# Patient Record
Sex: FEMALE
[De-identification: names, ages, dates, MRNs, and addresses within clinical notes are randomized per-mention and may not be internally consistent; named-entity substitution may affect disease eponyms.]

---

## 2024-02-09 PROBLEM — Z00.00 ENCOUNTER FOR PREVENTIVE HEALTH EXAMINATION: Status: ACTIVE | Noted: 2024-02-09

## 2024-02-13 ENCOUNTER — APPOINTMENT (OUTPATIENT)
Dept: OBGYN | Facility: CLINIC | Age: 44
End: 2024-02-13

## 2024-02-26 ENCOUNTER — APPOINTMENT (OUTPATIENT)
Dept: OBGYN | Facility: CLINIC | Age: 44
End: 2024-02-26
Payer: COMMERCIAL

## 2024-02-26 VITALS
WEIGHT: 185 LBS | DIASTOLIC BLOOD PRESSURE: 73 MMHG | SYSTOLIC BLOOD PRESSURE: 148 MMHG | BODY MASS INDEX: 27.4 KG/M2 | OXYGEN SATURATION: 99 % | HEIGHT: 69 IN | HEART RATE: 72 BPM

## 2024-02-26 DIAGNOSIS — N95.1 MENOPAUSAL AND FEMALE CLIMACTERIC STATES: ICD-10-CM

## 2024-02-26 PROCEDURE — 99203 OFFICE O/P NEW LOW 30 MIN: CPT

## 2024-02-26 RX ORDER — LEVONORGESTREL AND ETHINYL ESTRADIOL 0.1-0.02MG
0.1-2 KIT ORAL
Refills: 0 | Status: ACTIVE | COMMUNITY

## 2024-02-26 NOTE — PHYSICAL EXAM
[Appropriately responsive] : appropriately responsive [No Acute Distress] : no acute distress [Alert] : alert [No Lymphadenopathy] : no lymphadenopathy [Non-tender] : non-tender [Soft] : soft [Non-distended] : non-distended [No HSM] : No HSM [No Mass] : no mass [No Lesions] : no lesions [Oriented x3] : oriented x3

## 2024-02-26 NOTE — PLAN
[FreeTextEntry1] : ADAM SANDY MCCONNELL is a 43 year old presenting to discuss perimenopause -pt advised it is difficult to assess menopausal status given OCP use  -Discussed nutrition, exercise and stress reduction  -discussed normal aging

## 2024-02-26 NOTE — HISTORY OF PRESENT ILLNESS
[FreeTextEntry1] : ADAM MCCONNELL is a 43 year old G0 presenting for new pt visit to discuss perceived perimenopausal symptoms. Pt is on Lutera (20mcg E2). Pt reports withdrawal bleeds have been shorter and lighter over the past year. Reports 20-30 lbs weight gain in the past 18 months. C/o memory loss and mild hot flashes. Pt has not seen a GYN in many years, reports PCP does paps and refers her for mammos. Pt believes mother went through menopause in mid 40s. Pt last mammo was 11/2023 and last pap 2/2023.

## 2024-02-26 NOTE — END OF VISIT
[FreeTextEntry3] : I, Diane Kovacs, acted solely as a scribe for Dr. Kylie Lester, on 02/26/2024.   All medical record entries made by the scribe were at my, Dr. Kylie Lester., direction and personally dictated by me on 02/26/2024. I have personally reviewed the chart and agree that the record accurately reflects my personal performance of the history, physical exam, assessment and plan.